# Patient Record
Sex: MALE | Race: WHITE | NOT HISPANIC OR LATINO
[De-identification: names, ages, dates, MRNs, and addresses within clinical notes are randomized per-mention and may not be internally consistent; named-entity substitution may affect disease eponyms.]

---

## 2023-01-10 PROBLEM — Z00.00 ENCOUNTER FOR PREVENTIVE HEALTH EXAMINATION: Status: ACTIVE | Noted: 2023-01-10

## 2023-01-12 ENCOUNTER — APPOINTMENT (OUTPATIENT)
Dept: PEDIATRIC ORTHOPEDIC SURGERY | Facility: CLINIC | Age: 43
End: 2023-01-12
Payer: COMMERCIAL

## 2023-01-12 VITALS — HEIGHT: 70 IN | BODY MASS INDEX: 24.34 KG/M2 | WEIGHT: 170 LBS | TEMPERATURE: 97.8 F

## 2023-01-12 DIAGNOSIS — Z86.59 PERSONAL HISTORY OF OTHER MENTAL AND BEHAVIORAL DISORDERS: ICD-10-CM

## 2023-01-12 DIAGNOSIS — M54.12 RADICULOPATHY, CERVICAL REGION: ICD-10-CM

## 2023-01-12 PROCEDURE — 99072 ADDL SUPL MATRL&STAF TM PHE: CPT

## 2023-01-12 PROCEDURE — 99203 OFFICE O/P NEW LOW 30 MIN: CPT

## 2023-01-12 PROCEDURE — 72040 X-RAY EXAM NECK SPINE 2-3 VW: CPT

## 2023-01-12 RX ORDER — KETOROLAC TROMETHAMINE 10 MG/1
10 TABLET, FILM COATED ORAL 3 TIMES DAILY
Qty: 12 | Refills: 0 | Status: ACTIVE | COMMUNITY
Start: 2023-01-12 | End: 1900-01-01

## 2023-01-12 RX ORDER — CYCLOBENZAPRINE HYDROCHLORIDE 10 MG/1
10 TABLET, FILM COATED ORAL TWICE DAILY
Qty: 30 | Refills: 1 | Status: ACTIVE | COMMUNITY
Start: 2023-01-12 | End: 1900-01-01

## 2023-01-12 NOTE — PHYSICAL EXAM
[de-identified] : Exam today reveals a straight spine level pelvis and a normal gait.  He has reasonable motion to the cervical spine there is mild restriction of full extension and flexion with pain at the limits of his active motion.  Rotation and tilt to both sides of the midline is nicely maintained.  He does have spasm and tenderness on both sides of the midline in the subaxial region with no obvious trigger points present.  The remainder of the spine distally maintains good motion with no significant spasm or tenderness present.  He has excellent motion to both upper extremities at all levels with excellent strength present.  He is neurologically intact.\par \par X-rays ordered and taken today of the cervical spine reveal mild narrowing at C5-6 there is an old fracture of the spinous process at C7 consistent with achalasia of loose type injury  Otherwise unremarkable

## 2023-01-12 NOTE — ASSESSMENT
[FreeTextEntry1] : Impression: Cervical radiculitis.  Longstanding syrinx by history.\par \par This patient will be treated with restricted activities along with Flexeril Toradol with GI precautions and formal physical therapy.  He will return if he is not progressing.
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
